# Patient Record
Sex: MALE | Race: WHITE | Employment: UNEMPLOYED | URBAN - METROPOLITAN AREA
[De-identification: names, ages, dates, MRNs, and addresses within clinical notes are randomized per-mention and may not be internally consistent; named-entity substitution may affect disease eponyms.]

---
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
## 2023-06-05 ENCOUNTER — OFFICE VISIT (OUTPATIENT)
Dept: INTERNAL MEDICINE CLINIC | Facility: CLINIC | Age: 18
End: 2023-06-05

## 2023-06-05 VITALS
OXYGEN SATURATION: 97 % | SYSTOLIC BLOOD PRESSURE: 108 MMHG | WEIGHT: 164 LBS | HEART RATE: 74 BPM | DIASTOLIC BLOOD PRESSURE: 62 MMHG | HEIGHT: 68 IN | BODY MASS INDEX: 24.86 KG/M2

## 2023-06-05 DIAGNOSIS — Z13.21 ENCOUNTER FOR VITAMIN DEFICIENCY SCREENING: ICD-10-CM

## 2023-06-05 DIAGNOSIS — Z13.29 THYROID DISORDER SCREEN: ICD-10-CM

## 2023-06-05 DIAGNOSIS — Z00.00 WELLNESS EXAMINATION: Primary | ICD-10-CM

## 2023-06-05 DIAGNOSIS — Z13.0 SCREENING FOR DEFICIENCY ANEMIA: ICD-10-CM

## 2023-06-05 DIAGNOSIS — R73.09 ELEVATED GLUCOSE: ICD-10-CM

## 2023-06-05 DIAGNOSIS — Z13.220 LIPID SCREENING: ICD-10-CM

## 2023-06-05 PROBLEM — Q43.3: Status: ACTIVE | Noted: 2023-06-05

## 2023-06-05 PROBLEM — Q43.3 INTESTINAL MALROTATION: Status: ACTIVE | Noted: 2023-06-05

## 2023-06-05 PROCEDURE — 3078F DIAST BP <80 MM HG: CPT | Performed by: INTERNAL MEDICINE

## 2023-06-05 PROCEDURE — 99385 PREV VISIT NEW AGE 18-39: CPT | Performed by: INTERNAL MEDICINE

## 2023-06-05 PROCEDURE — 3074F SYST BP LT 130 MM HG: CPT | Performed by: INTERNAL MEDICINE

## 2023-06-05 PROCEDURE — 3008F BODY MASS INDEX DOCD: CPT | Performed by: INTERNAL MEDICINE

## 2025-06-13 ENCOUNTER — OFFICE VISIT (OUTPATIENT)
Dept: INTERNAL MEDICINE CLINIC | Facility: CLINIC | Age: 20
End: 2025-06-13

## 2025-06-13 VITALS
HEIGHT: 67.8 IN | HEART RATE: 106 BPM | SYSTOLIC BLOOD PRESSURE: 124 MMHG | BODY MASS INDEX: 25.61 KG/M2 | RESPIRATION RATE: 24 BRPM | OXYGEN SATURATION: 97 % | WEIGHT: 167 LBS | TEMPERATURE: 98 F | DIASTOLIC BLOOD PRESSURE: 68 MMHG

## 2025-06-13 DIAGNOSIS — Z12.11 ENCOUNTER FOR SCREENING COLONOSCOPY: ICD-10-CM

## 2025-06-13 DIAGNOSIS — Z12.5 ENCOUNTER FOR PROSTATE CANCER SCREENING: ICD-10-CM

## 2025-06-13 DIAGNOSIS — Z23 NEED FOR TDAP VACCINATION: ICD-10-CM

## 2025-06-13 DIAGNOSIS — D48.5 NEOPLASM OF UNCERTAIN BEHAVIOR OF SKIN: ICD-10-CM

## 2025-06-13 DIAGNOSIS — Z00.00 ENCOUNTER FOR GENERAL ADULT MEDICAL EXAMINATION WITHOUT ABNORMAL FINDINGS: Primary | ICD-10-CM

## 2025-06-13 NOTE — PROGRESS NOTES
REASON FOR VISIT      Larry Amos is a 19 year old male who presents for  Annual Physical Exam (not Medicare, or ABN signed for Medicare non covered service) and scheduled follow up of multiple significant but stable problems.         History of Present Illness  Larry Amos is a 19 year old male who presents for an annual physical exam.    He feels well overall with no specific concerns. No chest pain, shortness of breath, lightheadedness, dizziness, nausea, vomiting, diarrhea, or joint pains beyond normal soreness from working out. He engages in regular physical activity, including gym workouts and casual soccer and basketball with friends, without any associated chest pain, tightness, wheezing, or shortness of breath.    He has no significant family history of heart disease, diabetes, cholesterol issues, or cancer. His mother has a history of blood pressure issues, which are currently being managed with a reduced medication dosage.    He smokes marijuana approximately once a month and consumes alcohol occasionally, typically not exceeding three drinks per weekend. He denies any tobacco or other recreational drug use. He is sexually active with women, using condoms and his partner is on birth control.     He reports having received all standard childhood immunizations, including HPV, polio, and meningococcus. He occasionally experiences mild allergy symptoms, such as watery eyes and nasal stuffiness when playing soccer outdoors, for which he sometimes takes Benadryl.    He uses sunscreen but admits to inconsistent application. He has multiple moles, which he believes are benign, but one on the back of his neck may require further evaluation. He has had a mole removed in the past, in 2021, at the same facility.      HCM   - colon: start at 45  - PSA: start at 45  - labs: CBC and CMP ordered today  - imm: Flu shot? COVID booster? Shingles? PNA? Has gotten all his normal childhood vaccines. He us due for the  flu shot in the fall, and needs TDAP now.   - depression: neg         The following individual(s) verbally consented to be recorded using ambient AI listening technology and understand that they can each withdraw their consent to this listening technology at any point by asking the clinician to turn off or pause the recording:    Patient name: Larry Amos        Past Medical History     Past Medical History[1]     Past Surgical History     Past Surgical History[2]     Family History     Family History[3]    Social History     Short Social Hx on File[4]    Tobacco:  She currently has tobacco smoking, smokeless, or e-cigarette status listed as never assessed or unknown.    Please update the information in the Tobacco history section to reflect the true status, and refresh this smartlink.    CAGE Alcohol Screen:   Cage screening not needed because reported NO to Alcohol use on social history.    Depression Screening (PHQ):  PHQ-2/9 not done in last week, please ask questions. Last done              PEDRO-7 Total Score                 Allergies     Allergies[5]    Current Medications     No current outpatient medications on file.     No current facility-administered medications for this visit.       Screenings     History/Other:   Fall Risk Assessment:    (Incomplete)        Cognitive Assessment:    (Incomplete)  Tip  Cognitive assessment incomplete. Refresh to ensure screening pulls in; if not,click link above to assess, then refresh:[id number]}      Functional Ability/Status:    (Incomplete)      Immunization History   Administered Date(s) Administered    Covid-19 Vaccine Pfizer 30 mcg/0.3 ml 04/16/2021, 05/21/2021    DTAP 01/05/2004, 03/23/2004, 05/17/2004, 06/09/2005, 08/17/2009    FLULAVAL 6 months & older 0.5 ml Prefilled syringe (00407) 02/08/2018, 12/04/2018, 02/04/2020, 11/27/2020    FLUZONE 6 months and older PFS 0.5 ml (42149) 02/04/2020, 11/27/2020    HEP A 07/14/2010, 08/08/2011    HEP B 11/12/2004    HEP  B/HIB 01/05/2004, 03/23/2004, 05/17/2004, 11/12/2004    Hpv Virus Vaccine 9 Lisa Im 02/08/2018, 01/31/2019    IPV 01/05/2004, 03/23/2004, 05/17/2004, 11/20/2007    Influenza 10/14/2009    Influenza Vaccine, Preserv Free 11/12/2004, 01/12/2007    MMR 05/28/2005, 07/17/2008    Meningococcal-Menactra 08/12/2015    Meningococcal-Menveo 2month-55yr 02/08/2021    Pneumococcal Vaccine, Conjugate 01/05/2004, 03/23/2004, 11/12/2004    Pneumovax 23 07/14/2004    TDAP 12/06/2013    Varicella 05/28/2005, 07/17/2008   Pended Date(s) Pended    TDAP 06/13/2025       Health Maintenance   Topic Date Due    Meningococcal B Vaccine (1 of 2 - Standard) Never done    DTaP,Tdap,and Td Vaccines (7 - Td or Tdap) 12/06/2023    Annual Physical  06/05/2024    COVID-19 Vaccine (3 - 2024-25 season) 09/01/2024    Influenza Vaccine (Season Ended) 10/01/2025    Annual Depression Screening  Completed    Pneumococcal Vaccine: Birth to 50yrs  Completed    Hepatitis B Vaccines  Completed    Hepatitis A Vaccines  Completed    MMR Vaccines  Completed    Varicella Vaccines  Completed    Meningococcal Vaccine  Completed    HPV Vaccines  Completed         Review of Systems     GENERAL HEALTH: feels well otherwise  SKIN: denies any unusual skin lesions or rashes  RESPIRATORY: denies shortness of breath with exertion  CARDIOVASCULAR: denies chest pain on exertion  GI: denies abdominal pain and denies heartburn  : denies any burning with urination, urinary frequency or urgency  NEURO: denies headaches, numbness or tingling, mental status changes  PSYCH: denies depressed mood, anxiety  MUSC: denies muscle aches, joint pain      Physical Exam     /68 (BP Location: Right arm, Patient Position: Sitting, Cuff Size: adult)   Pulse 106   Temp 97.6 °F (36.4 °C) (Temporal)   Resp 24   Ht 5' 7.8\" (1.722 m)   Wt 167 lb (75.8 kg)   SpO2 97%   BMI 25.54 kg/m²   >   BP Readings from Last 3 Encounters:   06/13/25 124/68   06/05/23 108/62   02/08/21 119/67  (59%, Z = 0.23 /  49%, Z = -0.03)*     *BP percentiles are based on the 2017 AAP Clinical Practice Guideline for boys       GENERAL: well developed, well nourished, in no apparent distress  SKIN: no rashes, no suspicious lesions  HEENT: atraumatic, normocephalic, ears and throat are clear                Hearing Assessed via:   EYES: PERRLA, EOMI, conjunctiva are clear    CHEST: no chest tenderness  LUNGS: clear to auscultation  CARDIO: RRR without murmur  GI: good BS's, no masses, HSM or tenderness  : deferred   RECTAL: deferred  MUSCULOSKELETAL: back is not tender, FROM of the back  EXTREMITIES: no cyanosis, clubbing or edema  NEURO: Oriented times three, cranial nerves are intact, motor and sensory are grossly intact  FOOT: deferred      Assessment and Plan     Larry Amos is a 19 year old male who presents for an Annual Health Assessment.     Assessment & Plan  Encounter for general adult medical examination without abnormal findings  19-year-old male presenting for a routine physical examination. No significant family history of cardiovascular disease, diabetes, or cancer. Blood pressure is normal. No current medications required. He is up to date on childhood immunizations, including HPV, polio, and meningococcus. Tetanus vaccination is overdue, last received in 2013. Occasional marijuana use and moderate alcohol consumption reported. Sexually active with women, using condoms and partner on birth control. No current interest in STD screening. Routine screenings for prostate and colon cancer to begin in mid-forties unless family history indicates earlier screening.  - Administer tetanus vaccine today.  - Order CBC and CMP for routine screening.  - Provide dermatology referral for mole evaluation.  - Encourage sunscreen use during outdoor activities.  - [facility] visit summary and referral information.  Orders:    CBC With Differential With Platelet; Future    Comp Metabolic  Panel (14); Future    Encounter for screening colonoscopy  Start at 45       Encounter for prostate cancer screening  Start at        Neoplasm of uncertain behavior of skin  Follow with derm. Referral given today   Orders:    Derm Referral - In Network    Need for Tdap vaccination    Orders:    TETANUS, DIPHTHERIA TOXOIDS AND ACELLULAR PERTUSIS VACCINE (TDAP), >7 YEARS, IM USE             The patient indicates understanding of these issues and agrees to the plan.  The patient is asked to return in 1 year for AHA  Diet counseling perfomed  Exercise counseling perfomed    Electronically signed by Blossom Hernandez MD 25         [1]   Past Medical History:   Intestinal malrotation (HCC)    per NG; Repair at 1 day age, appendix removed same time.   [2]   Past Surgical History:  Procedure Laterality Date    Abdominal surgery  2003    ; instestinal surgery   [3]   Family History  Problem Relation Age of Onset    Hypertension Mother     Other (Other) Paternal Grandfather         Kidney dialysis    Lipids Neg         No family h/o hyperlipidemia    Asthma Neg     Diabetes Neg     Birth Defects Neg     Heart Disease Neg         No family h/o CAD    Cancer Neg    [4]   Social History  Socioeconomic History    Marital status: Single   Tobacco Use    Smoking status: Never    Smokeless tobacco: Never   Substance and Sexual Activity    Alcohol use: Never   Other Topics Concern    Second-hand smoke exposure No    Alcohol/drug concerns No    Violence concerns No    Reaction to local anesthetic No   [5] No Known Allergies

## (undated) NOTE — LETTER
Name:  Harini Dickey School Year:  8th Grade Class: Student ID No.:   Address:  [place] [id number] Phone:  [phone] (home)  :  13year old   Name Relationship [address] Work Phone Home Phone Mobile Phone   1.  Polina Moran, 12. Has anyone in your family had unexplained fainting, seizures, or near drowning? BONE AND JOINT QUESTIONS Yes No   17. Have you ever had an injury to a bone, muscle, ligament, or tendon that caused you to miss a practice or a game?      18. Have you /fall?     36. Have you ever become ill while exercising in the heat?     41. Do you get frequent muscle cramps when exercising? 42. Do you or someone in your family have sickle cell trait or disease? 43.  Have you ever had any problems with your ey Genitourinary (males only)* Yes    Skin:  HSV, lesions suggestive of MRSA, tinea corporis Yes    Neurologic* Yes    MUSCULOSKELETAL     Neck Yes    Back Yes    Shoulder/arm Yes    Elbow/forearm Yes    Wrist/hand/fingers Yes    Hip/thigh Yes    Knee Yes state series events or during the school day, and I/our student do/does hereby agree to submit to such testing and analysis by a certified laboratory.  We further understand and agree that the results of the performance-enhancing substance testing may be pr

## (undated) NOTE — LETTER
8/30/2018              Jaycob Lala        [address] [facility] [address]         To whom it may concern,    I saw Jaycob Lala in my office today.  Please excuse Jaycob Laal from PE/Sports from 8/30/2018 until cleared by physi

## (undated) NOTE — LETTER
VACCINE ADMINISTRATION RECORD  PARENT / GUARDIAN APPROVAL  Date: 2021  Vaccine administered to: Zaida Madrigal     : 2005    MRN: [id number]    A copy of the appropriate Centers for Disease Control and Prevention Vaccine Information statement

## (undated) NOTE — LETTER
Name:  Erick Randolph Year:  11th Grade Class: Student ID No.:   Address:  [address] [place] [address] Phone:  [phone] (home)  :  14year old   Name Relationship [address] Work Phone Home Phone Mobile Phone   1.  Polina Moran 13. Does anyone in your family have a heart problem, pacemaker, or implanted defibrillator? 12. Has anyone in your family had unexplained fainting, seizures, or near drowning?      BONE AND JOINT QUESTIONS Yes No   17. Have you ever had an injury to a b 38. Have you ever had numbness, tingling, or weakness in your arms or legs after being hit or falling? 39.Have you ever been unable to move your arms / legs after being hit /fall? 40. Have you ever become ill while exercising in the heat?     41.  D Eyes/Ears/Nose/Throat:  Pupils equal    Hearing Yes    Lymph nodes Yes    Heart*  · Murmurs (auscultation standing, supine, +/- Valsalva)  · Location of point of maximal impulse (PMI) Yes    Pulses Yes    Lungs Yes    Abdomen Yes    Genitourinary (males on As a prerequisite to participation in [employer] athletic activities, we agree that I/our student will not use performance-enhancing substances as defined in the [facility] Performance-Enhancing Substance Testing Program Protocol.  We have reviewed the policy and under

## (undated) NOTE — MR AVS SNAPSHOT
[address] [facility] [address] [phone]               Thank you for choosing us for your health care visit with Guilherme Panda MD.  We are glad to serve you and happy to provide you with this summa please let us know. The strep test we use is very good but no test is perfect and it can, infrequently, be wrong. And of course please call us if you develop any additional concerns or have any additional questions.     Diagnoses and all orders for this Please dose by weight whenever possible  Ibuprofen is dosed every 6-8 hours as needed  Never give more than 4 doses in a 24 hour period  Please note the difference in the strengths between Infant and Children's ibuprofen  *I would recommend only buying the [email or web address]     Sign up for Scoopinion access for your child. Scoopinion access allows you to view health information for your child from their recent   visit, view other health information and more.   To sign up or find more information on getting   Proxy Access to In addition to 5, 4, 3, 2, 1 families can make small changes in their family routines to help everyone lead healthier active lives.  Try:  o Eating breakfast everyday  o Eating low-fat dairy products like yogurt, milk, and cheese  o Regularly eating meals t

## (undated) NOTE — LETTER
VACCINE ADMINISTRATION RECORD  PARENT / GUARDIAN APPROVAL  Date: 2018  Vaccine administered to: Samina Bolus     : 2005    MRN: [id number]    A copy of the appropriate Centers for Disease Control and Prevention Vaccine Information statemen

## (undated) NOTE — LETTER
Name:  Hina Soto School Year:  10th Grade Class: Student ID No.:   Address:  [facility] [address] [address] Phone:  [phone] (home)  :  10year old   Name Relationship [address] Work Phone Home Phone Mobile Phone   1.  Rene Mccabe implanted defibrillator? 12. Has anyone in your family had unexplained fainting, seizures, or near drowning?      BONE AND JOINT QUESTIONS Yes No   17. Have you ever had an injury to a bone, muscle, ligament, or tendon that caused you to miss a practice 39.Have you ever been unable to move your arms / legs after being hit /fall? 40. Have you ever become ill while exercising in the heat?     41. Do you get frequent muscle cramps when exercising? 42.  Do you or someone in your family have sickle cell · Location of point of maximal impulse (PMI) Yes    Pulses Yes    Lungs Yes    Abdomen Yes    Genitourinary (males only)* Yes    Skin:  HSV, lesions suggestive of MRSA, tinea corporis Yes    Neurologic* Yes    MUSCULOSKELETAL     Neck Yes    Back Yes    Sh performance-enhancing substances in my/his/her body either during [employer] state series events or during the school day, and I/our student do/does hereby agree to submit to such testing and analysis by a certified laboratory.  We further understand and agree th

## (undated) NOTE — LETTER
[facility] [employer] of Child Health Examination       Student's Name  Jena Rollins D Signature                                                                                                                                     Title     DO                      Date  1/31/2019   Signature 11/6/2005  Sex  Male School   Grade Level/ID#  9th Grade   HEALTH HISTORY          TO BE COMPLETED AND SIGNED BY PARENT/GUARDIAN AND VERIFIED BY HEALTH CARE PROVIDER    ALLERGIES  (Food, drug, insect, other)  Patient has no known allergies.  MEDICATION  Volran Trudi PHYSICAL EXAMINATION REQUIREMENTS (head circumference if <33 years old):   /72   Pulse 73   Ht 5' 6.5\" (1.689 m)   Wt 59.6 kg (131 lb 6.4 oz)   BMI 20.89 kg/m²     DIABETES SCREENING  BMI>85% age/sex  No And any two of the following:  Family Histor Respiratory Yes                   Diagnosis of Asthma: No Mental Health Yes        Currently Prescribed Asthma Medication:            Quick-relief  medication (e.g. Short Acting Beta Antagonist): No          Controller medication (e.g. inhaled corticostero

## (undated) NOTE — LETTER
[facility] [employer] Child Health Examination       Student's Name  Harini PEPE Birth Title                           Date     Signature HEALTH HISTORY          TO BE COMPLETED AND SIGNED BY PARENT/GUARDIAN AND VERIFIED BY HEALTH CARE PROVIDER    ALLERGIES  (Food, drug, insect, other)  Patient has no known allergies.  MEDICATION  (List all prescribed or taken on a regular basis.)  No current /68   Ht 5' 5\" (1.651 m)   Wt 53.5 kg (118 lb)   BMI 19.64 kg/m²     DIABETES SCREENING  BMI>85% age/sex  No And any two of the following:  Family History No    Ethnic Minority  No          Signs of Insulin Resistance (hypertension, dyslipidemia, po Currently Prescribed Asthma Medication:            Quick-relief  medication (e.g. Short Acting Beta Antagonist): No          Controller medication (e.g. inhaled corticosteroid):   No Other   NEEDS/MODIFICATIONS required in the school setting  None DIET

## (undated) NOTE — LETTER
[facility] [employer] Child Health Examination       Student's Name  Harini PEPE Birth Signature                                                                                                                                   Title       MD                    Date  02/08/18   Signature Male School   Grade Level/ID#  8th Grade    HEALTH HISTORY          TO BE COMPLETED AND SIGNED BY PARENT/GUARDIAN AND VERIFIED BY HEALTH CARE PROVIDER    ALLERGIES  (Food, drug, insect, other) MEDICATION  (List all prescribed or taken on a regular basis.) /68   Ht 5' 5\" (1.651 m)   Wt 53.5 kg (118 lb)   BMI 19.64 kg/m²     DIABETES SCREENING  BMI>85% age/sex  No And any two of the following:  Family History No   Ethnic Minority  No          Signs of Insulin Resistance (hypertension, dyslipidemia, jorge Currently Prescribed Asthma Medication:            Quick-relief  medication (e.g. Short Acting Beta Antagonist): No          Controller medication (e.g. inhaled corticosteroid):   No Other   NEEDS/MODIFICATIONS required in the school setting  None DIET

## (undated) NOTE — LETTER
VACCINE ADMINISTRATION RECORD  PARENT / GUARDIAN APPROVAL  Date: 2019  Vaccine administered to: Marlyn Polo     : 2005    MRN: [id number]    A copy of the appropriate Centers for Disease Control and Prevention Vaccine Information statement